# Patient Record
Sex: MALE | Race: WHITE | Employment: FULL TIME | URBAN - METROPOLITAN AREA
[De-identification: names, ages, dates, MRNs, and addresses within clinical notes are randomized per-mention and may not be internally consistent; named-entity substitution may affect disease eponyms.]

---

## 2019-11-13 ENCOUNTER — OP HISTORICAL/CONVERTED ENCOUNTER (OUTPATIENT)
Dept: OTHER | Age: 39
End: 2019-11-13

## 2020-12-15 ENCOUNTER — TRANSCRIBE ORDER (OUTPATIENT)
Dept: SCHEDULING | Age: 40
End: 2020-12-15

## 2020-12-15 DIAGNOSIS — R10.11 RUQ ABDOMINAL PAIN: Primary | ICD-10-CM

## 2024-05-30 ENCOUNTER — TRANSCRIBE ORDERS (OUTPATIENT)
Facility: HOSPITAL | Age: 44
End: 2024-05-30

## 2024-05-30 DIAGNOSIS — N50.89 MASS OF RIGHT TESTICLE: Primary | ICD-10-CM

## 2024-06-05 ENCOUNTER — HOSPITAL ENCOUNTER (OUTPATIENT)
Facility: HOSPITAL | Age: 44
Discharge: HOME | End: 2024-06-08
Payer: MEDICAID

## 2024-06-05 DIAGNOSIS — N50.89 MASS OF RIGHT TESTICLE: ICD-10-CM

## 2024-06-05 PROCEDURE — 76870 US EXAM SCROTUM: CPT

## 2024-07-01 PROBLEM — N50.3 EPIDIDYMAL CYST: Status: ACTIVE | Noted: 2024-07-01

## 2024-07-16 ENCOUNTER — OFFICE VISIT (OUTPATIENT)
Age: 44
End: 2024-07-16
Payer: MEDICAID

## 2024-07-16 VITALS
HEART RATE: 85 BPM | HEIGHT: 75 IN | BODY MASS INDEX: 24.87 KG/M2 | WEIGHT: 200 LBS | SYSTOLIC BLOOD PRESSURE: 116 MMHG | DIASTOLIC BLOOD PRESSURE: 67 MMHG

## 2024-07-16 DIAGNOSIS — N50.3 EPIDIDYMAL CYST: Primary | ICD-10-CM

## 2024-07-16 DIAGNOSIS — R35.0 FREQUENCY OF MICTURITION: ICD-10-CM

## 2024-07-16 DIAGNOSIS — N52.9 ERECTILE DYSFUNCTION, UNSPECIFIED ERECTILE DYSFUNCTION TYPE: ICD-10-CM

## 2024-07-16 DIAGNOSIS — N41.9 PROSTATITIS, UNSPECIFIED PROSTATITIS TYPE: ICD-10-CM

## 2024-07-16 LAB
BILIRUBIN, URINE, POC: NEGATIVE
BLOOD URINE, POC: NEGATIVE
GLUCOSE URINE, POC: NEGATIVE
KETONES, URINE, POC: NEGATIVE
LEUKOCYTE ESTERASE, URINE, POC: NEGATIVE
NITRITE, URINE, POC: NEGATIVE
PH, URINE, POC: 7.5 (ref 4.6–8)
PROTEIN,URINE, POC: NEGATIVE
SPECIFIC GRAVITY, URINE, POC: 1.01 (ref 1–1.03)
URINALYSIS CLARITY, POC: CLEAR
URINALYSIS COLOR, POC: YELLOW
UROBILINOGEN, POC: NORMAL

## 2024-07-16 PROCEDURE — 99204 OFFICE O/P NEW MOD 45 MIN: CPT | Performed by: UROLOGY

## 2024-07-16 PROCEDURE — 81003 URINALYSIS AUTO W/O SCOPE: CPT | Performed by: UROLOGY

## 2024-07-16 RX ORDER — CYCLOBENZAPRINE HCL 10 MG
TABLET ORAL
COMMUNITY
Start: 2024-06-07

## 2024-07-16 RX ORDER — SILDENAFIL 100 MG/1
TABLET, FILM COATED ORAL
COMMUNITY

## 2024-07-16 RX ORDER — MELOXICAM 15 MG/1
TABLET ORAL
COMMUNITY
Start: 2024-06-07

## 2024-07-16 RX ORDER — CIPROFLOXACIN 500 MG/1
500 TABLET, FILM COATED ORAL 2 TIMES DAILY
Qty: 30 TABLET | Refills: 2 | Status: SHIPPED | OUTPATIENT
Start: 2024-07-16 | End: 2024-08-15

## 2024-07-16 RX ORDER — OMEPRAZOLE 20 MG/1
CAPSULE, DELAYED RELEASE ORAL
COMMUNITY
Start: 2024-05-30

## 2024-07-16 RX ORDER — TADALAFIL 20 MG/1
20 TABLET ORAL PRN
Qty: 90 TABLET | Refills: 1 | Status: SHIPPED | OUTPATIENT
Start: 2024-07-16 | End: 2025-07-16

## 2024-07-16 ASSESSMENT — ENCOUNTER SYMPTOMS
EYE ITCHING: 0
EYE REDNESS: 0

## 2024-07-16 NOTE — PROGRESS NOTES
HISTORY OF PRESENT ILLNESS  Osvaldo Veloz is a 43 y.o. male   Patient noticed a mass in his scrotum had ultrasound which showed a 1.9 epididymal cyst.  Patient drives a lot and has a lot of prostatitis symptoms as well as ED.  He has tried Viagra in the past with some success.  We spent a long time talk about his prostatitis  Patient here today with symptoms of prostatitis.  He has the following LUTS: urgency, frequency, dysuria, pain or discomfort in the scrotum or testes or groin.      He does also report sexual dysfunction or ED.    Patient spends a considerable amount of time in a vehicle.    He reports high-normal or high levels of stress or anxiety.    He consumes caffeine.  1. Epididymal cyst  Overview:  Us on 6/2024 revealed:    -19 mm right epididymal head cyst, correlating to palpable area of concern.  - No discrete testicular mass. 2 mm minimally complex cystic focus in the left  testicle inferiorly, too small to accurately characterize and of uncertain clinical significance..    Orders:  -     AMB POC URINALYSIS DIP STICK AUTO W/O MICRO  2. Prostatitis, unspecified prostatitis type  -     ciprofloxacin (CIPRO) 500 MG tablet; Take 1 tablet by mouth 2 times daily, Disp-30 tablet, R-2Normal  3. Frequency of micturition  4. Erectile dysfunction, unspecified erectile dysfunction type  -     tadalafil (CIALIS) 20 MG tablet; Take 1 tablet by mouth as needed for Erectile Dysfunction, Disp-90 tablet, R-1Normal        PAST MEDICAL HISTORY  PMHx (including negatives):  has no past medical history on file.   PSurgHx:  has a past surgical history that includes knee surgery (Right).  PSocHx:  reports that he has quit smoking. His smoking use included cigarettes. He started smoking about 32 years ago. He has a 48.8 pack-year smoking history. He has never used smokeless tobacco. He reports that he does not currently use alcohol. He reports current drug use. Drug: Marijuana (Weed).   FamilyHX: History reviewed. No

## 2024-07-16 NOTE — PROGRESS NOTES
Chief Complaint   Patient presents with    epididymal cyst      1. Have you been to the ER, urgent care clinic since your last visit?  Hospitalized since your last visit?No    2. Have you seen or consulted any other health care providers outside of the [facility] since your last visit?  Include any pap smears or colon screening. No  /67   Pulse 85   Ht 1.905 m (6' 3\")   Wt 90.7 kg (200 lb)   BMI 25.00 kg/m²

## 2024-07-26 ENCOUNTER — PREP FOR PROCEDURE (OUTPATIENT)
Age: 44
End: 2024-07-26

## 2024-08-20 ENCOUNTER — HOSPITAL ENCOUNTER (OUTPATIENT)
Facility: HOSPITAL | Age: 44
Discharge: HOME | End: 2024-08-23
Payer: MEDICAID

## 2024-08-20 VITALS
HEIGHT: 75 IN | TEMPERATURE: 98.9 F | DIASTOLIC BLOOD PRESSURE: 81 MMHG | RESPIRATION RATE: 16 BRPM | SYSTOLIC BLOOD PRESSURE: 121 MMHG | HEART RATE: 75 BPM | WEIGHT: 210.6 LBS | BODY MASS INDEX: 26.19 KG/M2 | OXYGEN SATURATION: 98 %

## 2024-08-20 LAB
ANION GAP SERPL CALC-SCNC: 7 MMOL/L (ref 5–15)
BUN SERPL-MCNC: 19 MG/DL (ref 6–20)
BUN/CREAT SERPL: 20 (ref 12–20)
CA-I BLD-MCNC: 9.2 MG/DL (ref 8.5–10.1)
CHLORIDE SERPL-SCNC: 108 MMOL/L (ref 97–108)
CO2 SERPL-SCNC: 23 MMOL/L (ref 21–32)
CREAT SERPL-MCNC: 0.94 MG/DL (ref 0.7–1.3)
ERYTHROCYTE [DISTWIDTH] IN BLOOD BY AUTOMATED COUNT: 12.5 % (ref 11.5–14.5)
GLUCOSE SERPL-MCNC: 129 MG/DL (ref 65–100)
HCT VFR BLD AUTO: 41.4 % (ref 36.6–50.3)
HGB BLD-MCNC: 14.4 G/DL (ref 12.1–17)
MCH RBC QN AUTO: 30.8 PG (ref 26–34)
MCHC RBC AUTO-ENTMCNC: 34.8 G/DL (ref 30–36.5)
MCV RBC AUTO: 88.7 FL (ref 80–99)
NRBC # BLD: 0 K/UL (ref 0–0.01)
NRBC BLD-RTO: 0 PER 100 WBC
PLATELET # BLD AUTO: 239 K/UL (ref 150–400)
PMV BLD AUTO: 10 FL (ref 8.9–12.9)
POTASSIUM SERPL-SCNC: 3.7 MMOL/L (ref 3.5–5.1)
RBC # BLD AUTO: 4.67 M/UL (ref 4.1–5.7)
SODIUM SERPL-SCNC: 138 MMOL/L (ref 136–145)
WBC # BLD AUTO: 10 K/UL (ref 4.1–11.1)

## 2024-08-20 PROCEDURE — 85027 COMPLETE CBC AUTOMATED: CPT

## 2024-08-20 PROCEDURE — 87086 URINE CULTURE/COLONY COUNT: CPT

## 2024-08-20 PROCEDURE — 36415 COLL VENOUS BLD VENIPUNCTURE: CPT

## 2024-08-20 PROCEDURE — 80048 BASIC METABOLIC PNL TOTAL CA: CPT

## 2024-08-20 RX ORDER — HYDROXYZINE HYDROCHLORIDE 25 MG/1
25 TABLET, FILM COATED ORAL 3 TIMES DAILY PRN
COMMUNITY

## 2024-08-20 RX ORDER — CIPROFLOXACIN 500 MG/1
500 TABLET, FILM COATED ORAL 2 TIMES DAILY
COMMUNITY

## 2024-08-21 LAB
BACTERIA SPEC CULT: NORMAL
Lab: NORMAL

## 2024-08-30 ENCOUNTER — ANESTHESIA (OUTPATIENT)
Facility: HOSPITAL | Age: 44
End: 2024-08-30
Payer: MEDICAID

## 2024-08-30 ENCOUNTER — ANESTHESIA EVENT (OUTPATIENT)
Facility: HOSPITAL | Age: 44
End: 2024-08-30
Payer: MEDICAID

## 2024-08-30 ENCOUNTER — HOSPITAL ENCOUNTER (OUTPATIENT)
Facility: HOSPITAL | Age: 44
Discharge: HOME | End: 2024-08-30
Attending: UROLOGY | Admitting: UROLOGY
Payer: MEDICAID

## 2024-08-30 ENCOUNTER — TELEPHONE (OUTPATIENT)
Age: 44
End: 2024-08-30

## 2024-08-30 VITALS
OXYGEN SATURATION: 97 % | DIASTOLIC BLOOD PRESSURE: 76 MMHG | TEMPERATURE: 97.5 F | RESPIRATION RATE: 16 BRPM | HEART RATE: 53 BPM | SYSTOLIC BLOOD PRESSURE: 114 MMHG

## 2024-08-30 DIAGNOSIS — G89.18 POST-OP PAIN: Primary | ICD-10-CM

## 2024-08-30 DIAGNOSIS — N50.3 EPIDIDYMAL CYST: ICD-10-CM

## 2024-08-30 PROCEDURE — 3600000002 HC SURGERY LEVEL 2 BASE: Performed by: UROLOGY

## 2024-08-30 PROCEDURE — 6360000002 HC RX W HCPCS: Performed by: UROLOGY

## 2024-08-30 PROCEDURE — 54860 REMOVAL OF EPIDIDYMIS: CPT | Performed by: UROLOGY

## 2024-08-30 PROCEDURE — 7100000001 HC PACU RECOVERY - ADDTL 15 MIN: Performed by: UROLOGY

## 2024-08-30 PROCEDURE — 7100000000 HC PACU RECOVERY - FIRST 15 MIN: Performed by: UROLOGY

## 2024-08-30 PROCEDURE — 7100000011 HC PHASE II RECOVERY - ADDTL 15 MIN: Performed by: UROLOGY

## 2024-08-30 PROCEDURE — 88302 TISSUE EXAM BY PATHOLOGIST: CPT

## 2024-08-30 PROCEDURE — 3600000012 HC SURGERY LEVEL 2 ADDTL 15MIN: Performed by: UROLOGY

## 2024-08-30 PROCEDURE — 3700000000 HC ANESTHESIA ATTENDED CARE: Performed by: UROLOGY

## 2024-08-30 PROCEDURE — 6360000002 HC RX W HCPCS: Performed by: NURSE ANESTHETIST, CERTIFIED REGISTERED

## 2024-08-30 PROCEDURE — 3700000001 HC ADD 15 MINUTES (ANESTHESIA): Performed by: UROLOGY

## 2024-08-30 PROCEDURE — 7100000010 HC PHASE II RECOVERY - FIRST 15 MIN: Performed by: UROLOGY

## 2024-08-30 PROCEDURE — 2580000003 HC RX 258: Performed by: UROLOGY

## 2024-08-30 PROCEDURE — 2709999900 HC NON-CHARGEABLE SUPPLY: Performed by: UROLOGY

## 2024-08-30 RX ORDER — KETOROLAC TROMETHAMINE 30 MG/ML
30 INJECTION, SOLUTION INTRAMUSCULAR; INTRAVENOUS ONCE
Status: COMPLETED | OUTPATIENT
Start: 2024-08-30 | End: 2024-08-30

## 2024-08-30 RX ORDER — SODIUM CHLORIDE, SODIUM LACTATE, POTASSIUM CHLORIDE, CALCIUM CHLORIDE 600; 310; 30; 20 MG/100ML; MG/100ML; MG/100ML; MG/100ML
INJECTION, SOLUTION INTRAVENOUS ONCE
Status: DISCONTINUED | OUTPATIENT
Start: 2024-08-30 | End: 2024-08-30 | Stop reason: HOSPADM

## 2024-08-30 RX ORDER — PROPOFOL 10 MG/ML
INJECTION, EMULSION INTRAVENOUS PRN
Status: DISCONTINUED | OUTPATIENT
Start: 2024-08-30 | End: 2024-08-30 | Stop reason: SDUPTHER

## 2024-08-30 RX ORDER — SODIUM CHLORIDE, SODIUM LACTATE, POTASSIUM CHLORIDE, CALCIUM CHLORIDE 600; 310; 30; 20 MG/100ML; MG/100ML; MG/100ML; MG/100ML
INJECTION, SOLUTION INTRAVENOUS CONTINUOUS
Status: DISCONTINUED | OUTPATIENT
Start: 2024-08-30 | End: 2024-08-30 | Stop reason: HOSPADM

## 2024-08-30 RX ORDER — FENTANYL CITRATE 50 UG/ML
50 INJECTION, SOLUTION INTRAMUSCULAR; INTRAVENOUS EVERY 5 MIN PRN
Status: DISCONTINUED | OUTPATIENT
Start: 2024-08-30 | End: 2024-08-30 | Stop reason: HOSPADM

## 2024-08-30 RX ORDER — DIPHENHYDRAMINE HYDROCHLORIDE 50 MG/ML
12.5 INJECTION INTRAMUSCULAR; INTRAVENOUS
Status: DISCONTINUED | OUTPATIENT
Start: 2024-08-30 | End: 2024-08-30 | Stop reason: HOSPADM

## 2024-08-30 RX ORDER — IPRATROPIUM BROMIDE AND ALBUTEROL SULFATE 2.5; .5 MG/3ML; MG/3ML
1 SOLUTION RESPIRATORY (INHALATION)
Status: DISCONTINUED | OUTPATIENT
Start: 2024-08-30 | End: 2024-08-30 | Stop reason: HOSPADM

## 2024-08-30 RX ORDER — DEXTROSE MONOHYDRATE 100 MG/ML
INJECTION, SOLUTION INTRAVENOUS CONTINUOUS PRN
Status: DISCONTINUED | OUTPATIENT
Start: 2024-08-30 | End: 2024-08-30 | Stop reason: HOSPADM

## 2024-08-30 RX ORDER — ONDANSETRON 2 MG/ML
4 INJECTION INTRAMUSCULAR; INTRAVENOUS
Status: DISCONTINUED | OUTPATIENT
Start: 2024-08-30 | End: 2024-08-30 | Stop reason: HOSPADM

## 2024-08-30 RX ORDER — OXYCODONE HYDROCHLORIDE 5 MG/1
5 TABLET ORAL PRN
Status: DISCONTINUED | OUTPATIENT
Start: 2024-08-30 | End: 2024-08-30 | Stop reason: HOSPADM

## 2024-08-30 RX ORDER — HYDRALAZINE HYDROCHLORIDE 20 MG/ML
10 INJECTION INTRAMUSCULAR; INTRAVENOUS
Status: DISCONTINUED | OUTPATIENT
Start: 2024-08-30 | End: 2024-08-30 | Stop reason: HOSPADM

## 2024-08-30 RX ORDER — FENTANYL CITRATE 50 UG/ML
INJECTION, SOLUTION INTRAMUSCULAR; INTRAVENOUS PRN
Status: DISCONTINUED | OUTPATIENT
Start: 2024-08-30 | End: 2024-08-30 | Stop reason: SDUPTHER

## 2024-08-30 RX ORDER — MEPERIDINE HYDROCHLORIDE 25 MG/ML
12.5 INJECTION INTRAMUSCULAR; INTRAVENOUS; SUBCUTANEOUS EVERY 5 MIN PRN
Status: DISCONTINUED | OUTPATIENT
Start: 2024-08-30 | End: 2024-08-30 | Stop reason: HOSPADM

## 2024-08-30 RX ORDER — SODIUM CHLORIDE 9 MG/ML
INJECTION, SOLUTION INTRAVENOUS PRN
Status: DISCONTINUED | OUTPATIENT
Start: 2024-08-30 | End: 2024-08-30 | Stop reason: HOSPADM

## 2024-08-30 RX ORDER — CEPHALEXIN 500 MG/1
500 CAPSULE ORAL 2 TIMES DAILY
Qty: 14 CAPSULE | Refills: 0 | Status: SHIPPED | OUTPATIENT
Start: 2024-08-30 | End: 2024-09-06

## 2024-08-30 RX ORDER — LIDOCAINE 4 G/G
1 PATCH TOPICAL AS NEEDED
Status: DISCONTINUED | OUTPATIENT
Start: 2024-08-30 | End: 2024-08-30 | Stop reason: HOSPADM

## 2024-08-30 RX ORDER — NALOXONE HYDROCHLORIDE 0.4 MG/ML
INJECTION, SOLUTION INTRAMUSCULAR; INTRAVENOUS; SUBCUTANEOUS PRN
Status: DISCONTINUED | OUTPATIENT
Start: 2024-08-30 | End: 2024-08-30 | Stop reason: HOSPADM

## 2024-08-30 RX ORDER — DEXAMETHASONE SODIUM PHOSPHATE 4 MG/ML
INJECTION, SOLUTION INTRA-ARTICULAR; INTRALESIONAL; INTRAMUSCULAR; INTRAVENOUS; SOFT TISSUE PRN
Status: DISCONTINUED | OUTPATIENT
Start: 2024-08-30 | End: 2024-08-30 | Stop reason: SDUPTHER

## 2024-08-30 RX ORDER — ONDANSETRON 2 MG/ML
INJECTION INTRAMUSCULAR; INTRAVENOUS PRN
Status: DISCONTINUED | OUTPATIENT
Start: 2024-08-30 | End: 2024-08-30 | Stop reason: SDUPTHER

## 2024-08-30 RX ORDER — KETOROLAC TROMETHAMINE 10 MG/1
10 TABLET, FILM COATED ORAL EVERY 6 HOURS PRN
Qty: 20 TABLET | Refills: 0 | Status: SHIPPED | OUTPATIENT
Start: 2024-08-30

## 2024-08-30 RX ORDER — OXYCODONE AND ACETAMINOPHEN 5; 325 MG/1; MG/1
1 TABLET ORAL EVERY 6 HOURS PRN
Qty: 12 TABLET | Refills: 0 | Status: SHIPPED | OUTPATIENT
Start: 2024-08-30 | End: 2024-09-02

## 2024-08-30 RX ORDER — BUPIVACAINE HYDROCHLORIDE 5 MG/ML
INJECTION, SOLUTION EPIDURAL; INTRACAUDAL PRN
Status: DISCONTINUED | OUTPATIENT
Start: 2024-08-30 | End: 2024-08-30 | Stop reason: HOSPADM

## 2024-08-30 RX ORDER — SODIUM CHLORIDE 0.9 % (FLUSH) 0.9 %
5-40 SYRINGE (ML) INJECTION EVERY 12 HOURS SCHEDULED
Status: DISCONTINUED | OUTPATIENT
Start: 2024-08-30 | End: 2024-08-30 | Stop reason: HOSPADM

## 2024-08-30 RX ORDER — LABETALOL HYDROCHLORIDE 5 MG/ML
10 INJECTION, SOLUTION INTRAVENOUS
Status: DISCONTINUED | OUTPATIENT
Start: 2024-08-30 | End: 2024-08-30 | Stop reason: HOSPADM

## 2024-08-30 RX ORDER — SODIUM CHLORIDE 0.9 % (FLUSH) 0.9 %
5-40 SYRINGE (ML) INJECTION PRN
Status: DISCONTINUED | OUTPATIENT
Start: 2024-08-30 | End: 2024-08-30 | Stop reason: HOSPADM

## 2024-08-30 RX ORDER — METOCLOPRAMIDE HYDROCHLORIDE 5 MG/ML
10 INJECTION INTRAMUSCULAR; INTRAVENOUS
Status: DISCONTINUED | OUTPATIENT
Start: 2024-08-30 | End: 2024-08-30 | Stop reason: HOSPADM

## 2024-08-30 RX ORDER — GLUCAGON 1 MG/ML
1 KIT INJECTION PRN
Status: DISCONTINUED | OUTPATIENT
Start: 2024-08-30 | End: 2024-08-30 | Stop reason: HOSPADM

## 2024-08-30 RX ORDER — OXYCODONE HYDROCHLORIDE 5 MG/1
10 TABLET ORAL PRN
Status: DISCONTINUED | OUTPATIENT
Start: 2024-08-30 | End: 2024-08-30 | Stop reason: HOSPADM

## 2024-08-30 RX ORDER — LORAZEPAM 2 MG/ML
0.5 INJECTION INTRAMUSCULAR
Status: DISCONTINUED | OUTPATIENT
Start: 2024-08-30 | End: 2024-08-30 | Stop reason: HOSPADM

## 2024-08-30 RX ORDER — MIDAZOLAM HYDROCHLORIDE 1 MG/ML
INJECTION INTRAMUSCULAR; INTRAVENOUS PRN
Status: DISCONTINUED | OUTPATIENT
Start: 2024-08-30 | End: 2024-08-30 | Stop reason: SDUPTHER

## 2024-08-30 RX ORDER — HYDROMORPHONE HYDROCHLORIDE 1 MG/ML
0.5 INJECTION, SOLUTION INTRAMUSCULAR; INTRAVENOUS; SUBCUTANEOUS EVERY 5 MIN PRN
Status: DISCONTINUED | OUTPATIENT
Start: 2024-08-30 | End: 2024-08-30 | Stop reason: HOSPADM

## 2024-08-30 RX ADMIN — MIDAZOLAM HYDROCHLORIDE 2 MG: 1 INJECTION INTRAMUSCULAR; INTRAVENOUS at 08:59

## 2024-08-30 RX ADMIN — CEFAZOLIN 2000 MG: 1 INJECTION, POWDER, FOR SOLUTION INTRAMUSCULAR; INTRAVENOUS at 08:59

## 2024-08-30 RX ADMIN — KETOROLAC TROMETHAMINE 30 MG: 30 INJECTION, SOLUTION INTRAMUSCULAR; INTRAVENOUS at 10:23

## 2024-08-30 RX ADMIN — SODIUM CHLORIDE, POTASSIUM CHLORIDE, SODIUM LACTATE AND CALCIUM CHLORIDE: 600; 310; 30; 20 INJECTION, SOLUTION INTRAVENOUS at 08:59

## 2024-08-30 RX ADMIN — DEXAMETHASONE SODIUM PHOSPHATE 4 MG: 4 INJECTION, SOLUTION INTRA-ARTICULAR; INTRALESIONAL; INTRAMUSCULAR; INTRAVENOUS; SOFT TISSUE at 09:10

## 2024-08-30 RX ADMIN — ONDANSETRON 4 MG: 2 INJECTION INTRAMUSCULAR; INTRAVENOUS at 09:10

## 2024-08-30 RX ADMIN — PROPOFOL 150 MG: 10 INJECTION, EMULSION INTRAVENOUS at 09:05

## 2024-08-30 RX ADMIN — FENTANYL CITRATE 100 MCG: 50 INJECTION, SOLUTION INTRAMUSCULAR; INTRAVENOUS at 09:05

## 2024-08-30 ASSESSMENT — PAIN - FUNCTIONAL ASSESSMENT
PAIN_FUNCTIONAL_ASSESSMENT: 0-10

## 2024-08-30 ASSESSMENT — PAIN SCALES - GENERAL
PAINLEVEL_OUTOF10: 0

## 2024-08-30 ASSESSMENT — LIFESTYLE VARIABLES: SMOKING_STATUS: 1

## 2024-08-30 NOTE — ANESTHESIA PRE PROCEDURE
Department of Anesthesiology  Preprocedure Note       Name:  Osvaldo Veloz   Age:  43 y.o.  :  1980                                          MRN:  [id number]         Date:  2024      Surgeon: Surgeon(s):  Dwight Mercado MD    Procedure: Procedure(s):  RIGHT SPERMATOCELE REPAIR    Medications prior to admission:   Prior to Admission medications    Medication Sig Start Date End Date Taking? Authorizing Provider   hydrOXYzine HCl (ATARAX) 25 MG tablet Take 1 tablet by mouth 3 times daily as needed for Anxiety   Yes Ashok Stovall MD   cyclobenzaprine (FLEXERIL) 10 MG tablet Take 1 tablet by mouth nightly as needed for Muscle spasms 24  Yes Ashok Stovall MD   omeprazole (PRILOSEC) 20 MG delayed release capsule Take 1 capsule by mouth Daily 24  Yes Ashok Stovall MD   ciprofloxacin (CIPRO) 500 MG tablet Take 1 tablet by mouth 2 times daily  Patient not taking: Reported on 2024    Ashok Stovall MD   meloxicam (MOBIC) 15 MG tablet TAKE DAILY FOR 5 DAYS AND THEN TAKE AS NEEDED ONLY.  TAKE WITH FOOD. Do not take Advil, Motrin or Aleve while taking this medication.  TAKE AFTER MEDROL DOSPAK IS COMPLETED.  Patient not taking: Reported on 2024   Ashok Stovall MD   sildenafil (VIAGRA) 100 MG tablet TAKE 1 TABLET BY MOUTH ONCE DAILY FOR 30 DAYS    Ashok Stovall MD   tadalafil (CIALIS) 20 MG tablet Take 1 tablet by mouth as needed for Erectile Dysfunction 24  Dwight Mercado MD       Current medications:    Current Facility-Administered Medications   Medication Dose Route Frequency Provider Last Rate Last Admin    lactated ringers IV soln infusion   IntraVENous Continuous Dwight Mercado MD        ceFAZolin (ANCEF) 2,000 mg in sterile water 20 mL IV syringe  2,000 mg IntraVENous Once Dwight Mercado MD           Allergies:    Allergies   Allergen Reactions    Shellfish-Derived Products Hives    Shrimp Flavor Hives  08/20/2024 01:50 PM    K 3.7 08/20/2024 01:50 PM     08/20/2024 01:50 PM    CO2 23 08/20/2024 01:50 PM    BUN 19 08/20/2024 01:50 PM    CREATININE 0.94 08/20/2024 01:50 PM    LABGLOM >90 08/20/2024 01:50 PM    GLUCOSE 129 08/20/2024 01:50 PM    CALCIUM 9.2 08/20/2024 01:50 PM       POC Tests: No results for input(s): \"POCGLU\", \"POCNA\", \"POCK\", \"POCCL\", \"POCBUN\", \"POCHEMO\", \"POCHCT\" in the last 72 hours.    Coags: No results found for: \"PROTIME\", \"INR\", \"APTT\"    HCG (If Applicable): No results found for: \"PREGTESTUR\", \"PREGSERUM\", \"HCG\", \"HCGQUANT\"     ABGs: No results found for: \"PHART\", \"PO2ART\", \"BER9ALT\", \"KPJ8CPJ\", \"BEART\", \"S1DFKSHD\"     Type & Screen (If Applicable):  No results found for: \"LABABO\"    Drug/Infectious Status (If Applicable):  No results found for: \"HIV\", \"HEPCAB\"    COVID-19 Screening (If Applicable): No results found for: \"COVID19\"        Anesthesia Evaluation  Patient summary reviewed and Nursing notes reviewed   no history of anesthetic complications:   Airway: Mallampati: II  TM distance: >3 FB   Neck ROM: full  Mouth opening: > = 3 FB   Dental:    (+) edentulous      Pulmonary:normal exam  breath sounds clear to auscultation  (+)           current smoker                           Cardiovascular:Negative CV ROS            Rhythm: regular  Rate: normal                    Neuro/Psych:   Negative Neuro/Psych ROS              GI/Hepatic/Renal: Neg GI/Hepatic/Renal ROS            Endo/Other: Negative Endo/Other ROS                    Abdominal:              PE comment: Deferred.   Vascular: negative vascular ROS.         Other Findings:             Anesthesia Plan      general     ASA 2     (Standard ASA monitors: continuous EKG, BP, HR, pulse oximeter, temperature, and capnography.)  Induction: intravenous.    MIPS: Postoperative opioids intended and Prophylactic antiemetics administered.  Anesthetic plan and risks discussed with patient (and family, if  present.).                        Keny Webber MD   8/30/2024

## 2024-08-30 NOTE — PROGRESS NOTES
DRESSING TO SCROTUM DRY AND INTACT WITH SCROTAL SUPPORT IN PLACE , PT ASSISTED TO BR , VOIDED , IV DC'D SITE INTACT NO SWELLING NOTED , DISCHARGE INSTRUCTIONS GIVEN TO PT AND PT'S WIFE  MENINAL , BOTH VERBALIZED UNDERSTANDING , NO DISTRESS NOTED

## 2024-08-30 NOTE — TELEPHONE ENCOUNTER
Had pa problems with the torodol. Pa done over the phone but would turn around time would be 24 hrs, which would not be great over the weekend. After speaking with anna, she thinks the percocet rx will be appropriate and he doesn't need torodol. I called pt and advised, and recommended he call back Monday if he begins to experience pain.

## 2024-08-30 NOTE — PROGRESS NOTES
Patient transferred to [facility] via stretcher in stable condition.  Bedside report given, SBAR reviewed, sites viewed. Patients spouse and belongings at the bedside.

## 2024-08-30 NOTE — DISCHARGE INSTRUCTIONS
Your Recovery    You are likely to have pain for the next few days. You may also feel like you have the flu, and you may have a low fever and feel tired and nauseated. This is common.    You should feel better after a few days and will probably feel much better in 7 days. For several weeks you may feel twinges or pulling in the repair when you move. You may have some bruising on the scrotum and along the penis. This is normal.    Men will need to wear a jockstrap or briefs, not boxers, for scrotal support for several days after a groin (inguinal) hernia repair. Etix bicycle shorts may provide good support.    This care sheet gives you a general idea about how long it will take for you to recover. But each person recovers at a different pace. Follow the steps below to get better as quickly as possible.    How can you care for yourself at home?    Activity  Rest when you feel tired. Getting enough sleep will help you recover. Sleep with your head up by using three or four pillows. You can also try to sleep with your head up in a recliner chair. Do not sleep on your side or stomach.  Try to walk each day. Start by walking a little more than you did the day before. Bit by bit, increase the amount you walk. Walking boosts blood flow and helps prevent pneumonia and constipation.  Put ice or a cold pack on the area of your repair for 10 to 15 minutes at a time. Try to do this every 1 to 2 hours for the first 24 hours (when you are awake) or until the swelling goes down. Put a thin cloth between the ice and your skin.  Avoid strenuous activities, such as biking, jogging, weight lifting, or aerobic exercise, until your doctor says it is okay.  Avoid lifting anything that would make you strain. This may include heavy grocery bags and milk containers, a heavy briefcase or backpack, cat litter or dog food bags, a child, or a vacuum .  You may drive when you are no longer taking pain medicine and can quickly move your  foot from the gas pedal to the brake. You must also be able to sit comfortably for a long period of time, even if you do not plan to go far. You might get caught in traffic.  Most people are able to return to work within 1 to 2 weeks after surgery.  You may shower 24 to 48 hours after surgery, if your doctor okays it. Pat the cut (incision) dry. Do not take a bath for the first 2 weeks, or until your doctor tells you it is okay.  Your doctor will tell you when you can have sex again.    Diet  You can eat your normal diet. If your stomach is upset, try bland, low-fat foods like plain rice, broiled chicken, toast, and yogurt.  Drink plenty of fluids (unless your doctor tells you not to).  You may notice that your bowel movements are not regular right after your surgery. This is common. Avoid constipation and straining with bowel movements. You may want to take a fiber supplement every day. If you have not had a bowel movement after a couple of days, ask your doctor about taking a mild laxative.    Medicines  Take pain medicines exactly as directed.  If the doctor gave you a prescription medicine for pain, take it as prescribed.  If you are not taking a prescription pain medicine, take an over-the-counter medicine such as acetaminophen (Tylenol), ibuprofen (Advil, Motrin), or naproxen (Aleve). Read and follow all instructions on the label.  Do not take two or more pain medicines at the same time unless the doctor told you to. Many pain medicines have acetaminophen, which is Tylenol. Too much acetaminophen (Tylenol) can be harmful.  If your doctor prescribed antibiotics, take them as directed. Do not stop taking them just because you feel better. You need to take the full course of antibiotics.  If you think your pain medicine is making you sick to your stomach:  Take your medicine after meals (unless your doctor has told you not to).  Ask your doctor for a different pain medicine.    Incision care  Wash the area daily

## 2024-08-30 NOTE — OP NOTE
Operative Note      Patient: Osvaldo Veloz  YOB: 1980  MRN: [id number]    Date of Procedure: 8/30/2024    Pre-Op Diagnosis Codes:      * Epididymal cyst [N50.3]    Post-Op Diagnosis: Same       Procedure(s):  RIGHT SPERMATOCELE REPAIR AND PARTIAL EPIDIDYMECTOMY    Surgeon(s):  Dwight Mercado MD    Assistant:   * No surgical staff found *    Anesthesia: General    Estimated Blood Loss (mL): Minimal    Complications: None    Specimens:   ID Type Source Tests Collected by Time Destination   1 : Right Spermatocele and Partial Epididymectomy Tissue Testis SURGICAL PATHOLOGY Dwight Mercado MD 8/30/2024 0941        Implants:  * No implants in log *      Drains: * No LDAs found *    Findings:  Infection Present At Time Of Surgery (PATOS) (choose all levels that have infection present):  No infection present  Other Findings: Cystic head of the epididymis    Detailed Description of Procedure:   Patient complains of severe pain around the cyst on his epididymis.  He wants the cystic area removed he is aware of the risk of bleeding infection injury to the epididymis infertility he is aware of alternatives he has no questions  Procedure patient prepped and draped in usual sterile fashion after undergoing general anesthesia and placed in supine position.  Timeout was performed preoperative antibiotics were given SCDs were applied  The scrotum was approached and a Marcaine block was used quarter percent Marcaine.  I then used a longitudinal incision and deliver the testicle out of the scrotum.  Open the door does reveal the epididymis and testicle.  He can see the whole head of the epididymis was multicystic..  I then dissected the whole head of the epididymis off and removed it.  0.40 Vicryl sutures around the portion of the epididymis I left behind.  There was minimal bleeding no injuries noted to the testicle or the rest of the epididymis.  I used cautery to fulgurate some very small bleeders then closed

## 2024-09-09 ENCOUNTER — OFFICE VISIT (OUTPATIENT)
Age: 44
End: 2024-09-09
Payer: MEDICAID

## 2024-09-09 VITALS — SYSTOLIC BLOOD PRESSURE: 129 MMHG | DIASTOLIC BLOOD PRESSURE: 80 MMHG | HEART RATE: 88 BPM

## 2024-09-09 DIAGNOSIS — N50.82 SCROTAL PAIN: ICD-10-CM

## 2024-09-09 DIAGNOSIS — N50.89 SCROTAL SWELLING: ICD-10-CM

## 2024-09-09 DIAGNOSIS — N50.3 EPIDIDYMAL CYST: Primary | ICD-10-CM

## 2024-09-09 DIAGNOSIS — N41.9 PROSTATITIS, UNSPECIFIED PROSTATITIS TYPE: ICD-10-CM

## 2024-09-09 LAB
BILIRUBIN, URINE, POC: NEGATIVE
BLOOD URINE, POC: NEGATIVE
GLUCOSE URINE, POC: NEGATIVE
KETONES, URINE, POC: NORMAL
LEUKOCYTE ESTERASE, URINE, POC: NEGATIVE
NITRITE, URINE, POC: NEGATIVE
PH, URINE, POC: 7 (ref 4.6–8)
PROTEIN,URINE, POC: NEGATIVE
SPECIFIC GRAVITY, URINE, POC: 1.02 (ref 1–1.03)
URINALYSIS CLARITY, POC: CLEAR
URINALYSIS COLOR, POC: YELLOW
UROBILINOGEN, POC: NORMAL

## 2024-09-09 PROCEDURE — 81003 URINALYSIS AUTO W/O SCOPE: CPT | Performed by: UROLOGY

## 2024-09-09 PROCEDURE — 99213 OFFICE O/P EST LOW 20 MIN: CPT | Performed by: UROLOGY

## 2024-10-18 NOTE — ANESTHESIA POSTPROCEDURE EVALUATION
Department of Anesthesiology  Postprocedure Note    Patient: Osvaldo Veloz  MRN: [id number]  YOB: 1980    Procedure Summary       Date: 08/30/24 Room / Location: [facility] MAIN OR 03 / [facility] MAIN OR    Anesthesia Start: 0900 Anesthesia Stop: 1016    Procedure: RIGHT SPERMATOCELE REPAIR AND PARTIAL EPIDIDYMECTOMY (Right: Scrotum) Diagnosis:       Epididymal cyst      (Epididymal cyst [N50.3])    Surgeons: Dwight Mercado MD Responsible Provider: Keny Webber MD    Anesthesia Type: general ASA Status: 2            Anesthesia Type: No value filed.    Elizabeth Phase I: Elizabeth Score: 10    Elizabeth Phase II: Elizabeth Score: 10    Anesthesia Post Evaluation    Patient location during evaluation: PACU  Patient participation: complete - patient cannot participate  Level of consciousness: awake  Pain score: 0  Airway patency: patent  Nausea & Vomiting: no nausea and no vomiting  Cardiovascular status: hemodynamically stable  Respiratory status: acceptable  Hydration status: stable  Multimodal analgesia pain management approach        No notable events documented.

## 2025-01-07 ENCOUNTER — OFFICE VISIT (OUTPATIENT)
Age: 45
End: 2025-01-07
Payer: MEDICAID

## 2025-01-07 VITALS — HEART RATE: 103 BPM | SYSTOLIC BLOOD PRESSURE: 144 MMHG | DIASTOLIC BLOOD PRESSURE: 79 MMHG

## 2025-01-07 DIAGNOSIS — N52.9 ERECTILE DYSFUNCTION, UNSPECIFIED ERECTILE DYSFUNCTION TYPE: ICD-10-CM

## 2025-01-07 DIAGNOSIS — N50.3 EPIDIDYMAL CYST: Primary | ICD-10-CM

## 2025-01-07 DIAGNOSIS — N50.89 SCROTAL SWELLING: ICD-10-CM

## 2025-01-07 DIAGNOSIS — N50.82 SCROTAL PAIN: ICD-10-CM

## 2025-01-07 DIAGNOSIS — L72.9 SCROTAL CYST: ICD-10-CM

## 2025-01-07 LAB
BILIRUBIN, URINE, POC: NEGATIVE
BLOOD URINE, POC: NEGATIVE
GLUCOSE URINE, POC: NEGATIVE
KETONES, URINE, POC: NEGATIVE
LEUKOCYTE ESTERASE, URINE, POC: NEGATIVE
NITRITE, URINE, POC: NEGATIVE
PH, URINE, POC: 6 (ref 4.6–8)
PROTEIN,URINE, POC: NEGATIVE
SPECIFIC GRAVITY, URINE, POC: 1.03 (ref 1–1.03)
URINALYSIS CLARITY, POC: CLEAR
URINALYSIS COLOR, POC: YELLOW
UROBILINOGEN, POC: NORMAL

## 2025-01-07 PROCEDURE — 99214 OFFICE O/P EST MOD 30 MIN: CPT | Performed by: UROLOGY

## 2025-01-07 PROCEDURE — 81003 URINALYSIS AUTO W/O SCOPE: CPT | Performed by: UROLOGY

## 2025-01-07 RX ORDER — IBUPROFEN 800 MG/1
TABLET, FILM COATED ORAL
COMMUNITY
Start: 2024-12-10

## 2025-01-07 RX ORDER — PREGABALIN 75 MG/1
75 CAPSULE ORAL 2 TIMES DAILY
COMMUNITY

## 2025-01-07 RX ORDER — TADALAFIL 20 MG/1
10 TABLET ORAL PRN
Qty: 90 TABLET | Refills: 2 | Status: SHIPPED | OUTPATIENT
Start: 2025-01-07 | End: 2026-01-07

## 2025-01-07 RX ORDER — METHYLPREDNISOLONE 4 MG/1
TABLET ORAL
COMMUNITY
Start: 2024-12-10

## 2025-01-07 NOTE — PROGRESS NOTES
HISTORY OF PRESENT ILLNESS  Osvaldo Veloz is a 44 y.o. male   Patient came in to talk about his sexual dysfunction and his cysts of his scrotum.  He had a cyst on his scrotum itself that popped and drained pus a couple weeks ago and now he feels much better.  And I looked upon today it looks like it was healed up  Second problem is his erectile dysfunction and his Cialis 5 mg today does not work form will increase to 10 mg a day and see if that works better  1. Epididymal cyst  Overview:  Us on 6/2024 revealed:    -19 mm right epididymal head cyst, correlating to palpable area of concern.  - No discrete testicular mass. 2 mm minimally complex cystic focus in the left  testicle inferiorly, too small to accurately characterize and of uncertain clinical significance..    He is s/p RIGHT SPERMATOCELE REPAIR AND PARTIAL EPIDIDYMECTOMY   Findings: Cystic head of the epididymis     Pathology:  Portion of epididymis with mild ductal dilatation   There appears to be mild ductal dilatation.  The specimen is entirely   submitted for histologic examination and a discreet cystic structure is   not identified.   Orders:  -     AMB POC URINALYSIS DIP STICK AUTO W/O MICRO  2. Erectile dysfunction, unspecified erectile dysfunction type  -     tadalafil (CIALIS) 20 MG tablet; Take 0.5 tablets by mouth as needed for Erectile Dysfunction 10 mg po q day, Disp-90 tablet, R-2Normal  3. Scrotal pain  4. Scrotal swelling  5. Scrotal cyst        PAST MEDICAL HISTORY  PMHx (including negatives):  has a past medical history of Awareness under anesthesia, DDD (degenerative disc disease), cervical, ED (erectile dysfunction), and Prostatitis.   PSurgHx:  has a past surgical history that includes knee surgery (Right); Multiple tooth extractions; Knee arthroscopy; and Testicle surgery (Right, 8/30/2024).  PSocHx:  reports that he has been smoking cigarettes. He started smoking about 33 years ago. He has a 49.5 pack-year smoking history. He has

## 2025-01-07 NOTE — PROGRESS NOTES
Chief Complaint   Patient presents with    Follow-up Chronic Condition        BP (!) 144/79 (Site: Left Upper Arm, Position: Sitting, Cuff Size: Large Adult)   Pulse (!) 103      PHQ-9 score is    Negative         No data to display                   1. \"Have you been to the ER, urgent care clinic since your last visit?  Hospitalized since your last visit?\" No    2. \"Have you seen or consulted any other health care providers outside of the [facility] since your last visit?\" No     3. For patients aged 45-75: Has the patient had a colonoscopy / FIT/ Cologuard? NA - based on age      If the patient is female:    4. For patients aged 40-74: Has the patient had a mammogram within the past 2 years? NA - based on age or sex      5. For patients aged 21-65: Has the patient had a pap smear? NA - based on age or sex

## (undated) DEVICE — SUTURE CHROMIC GUT SZ 3-0 L27IN ABSRB BRN L26MM SH 1/2 CIR G122H

## (undated) DEVICE — SUTURE VICRYL + SZ 2-0 L27IN ABSRB WHT SH 1/2 CIR TAPERCUT VCP417H

## (undated) DEVICE — SUTURE VICRYL + SZ 3-0 L27IN ABSRB UD L26MM SH 1/2 CIR VCP416H

## (undated) DEVICE — BLADE,CARBON-STEEL,15,STRL,DISPOSABLE,TB: Brand: MEDLINE

## (undated) DEVICE — SPONGE GZ W4XL4IN COT 12 PLY TYP VII WVN C FLD DSGN STERILE

## (undated) DEVICE — SOLUTION IRRIG 500ML 0.9% SOD CHLO USP POUR PLAS BTL

## (undated) DEVICE — WET SKIN PREP TRAY: Brand: MEDLINE INDUSTRIES, INC.

## (undated) DEVICE — SUSPENSORY SUPPORT W/ LEG STRAPS LARGE

## (undated) DEVICE — SYRINGE MED 30ML STD CLR PLAS LUERLOCK TIP N CTRL DISP

## (undated) DEVICE — GOWN SURG XL 56.5 IN AAMI LEVEL 3 ORBIS

## (undated) DEVICE — Device: Brand: JELCO

## (undated) DEVICE — MINOR GENERAL: Brand: MEDLINE INDUSTRIES, INC.

## (undated) DEVICE — DRAPE,LAPAROTOMY,PED,STERILE: Brand: MEDLINE

## (undated) DEVICE — SUTURE VICRYL SZ 4-0 L27IN ABSRB UD L17MM RB-1 1/2 CIR J214H

## (undated) DEVICE — SUTURE VICRYL SZ 2-0 L27IN ABSRB UD L26MM SH 1/2 CIR J417H

## (undated) DEVICE — SYRINGE IRRIG 60ML SFT PLIABLE BLB EZ TO GRP 1 HND USE W/